# Patient Record
Sex: FEMALE | Race: WHITE | Employment: OTHER | URBAN - METROPOLITAN AREA
[De-identification: names, ages, dates, MRNs, and addresses within clinical notes are randomized per-mention and may not be internally consistent; named-entity substitution may affect disease eponyms.]

---

## 2019-04-08 RX ORDER — M-VIT,TX,IRON,MINS/CALC/FOLIC 27MG-0.4MG
1 TABLET ORAL DAILY
COMMUNITY

## 2019-04-08 RX ORDER — ASPIRIN 81 MG/1
81 TABLET ORAL DAILY
COMMUNITY

## 2019-04-08 RX ORDER — EZETIMIBE 10 MG/1
10 TABLET ORAL DAILY
COMMUNITY

## 2019-04-08 RX ORDER — ROSUVASTATIN CALCIUM 40 MG/1
40 TABLET, COATED ORAL DAILY
COMMUNITY

## 2019-04-08 RX ORDER — LISINOPRIL 10 MG/1
10 TABLET ORAL DAILY
COMMUNITY

## 2019-04-08 RX ORDER — METFORMIN HYDROCHLORIDE 500 MG/1
500 TABLET, EXTENDED RELEASE ORAL 2 TIMES DAILY
COMMUNITY

## 2019-04-08 RX ORDER — OMEGA-3S/DHA/EPA/FISH OIL/D3 300MG-1000
400 CAPSULE ORAL DAILY
COMMUNITY

## 2019-04-10 ENCOUNTER — ANESTHESIA EVENT (OUTPATIENT)
Dept: OPERATING ROOM | Age: 74
End: 2019-04-10
Payer: MEDICARE

## 2019-04-10 ENCOUNTER — ANESTHESIA (OUTPATIENT)
Dept: OPERATING ROOM | Age: 74
End: 2019-04-10
Payer: MEDICARE

## 2019-04-10 ENCOUNTER — HOSPITAL ENCOUNTER (OUTPATIENT)
Age: 74
Setting detail: OUTPATIENT SURGERY
Discharge: HOME | End: 2019-04-10
Attending: OPHTHALMOLOGY | Admitting: OPHTHALMOLOGY
Payer: MEDICARE

## 2019-04-10 VITALS — DIASTOLIC BLOOD PRESSURE: 61 MMHG | SYSTOLIC BLOOD PRESSURE: 125 MMHG | OXYGEN SATURATION: 100 %

## 2019-04-10 VITALS
RESPIRATION RATE: 16 BRPM | OXYGEN SATURATION: 100 % | TEMPERATURE: 97.7 F | DIASTOLIC BLOOD PRESSURE: 67 MMHG | HEART RATE: 79 BPM | SYSTOLIC BLOOD PRESSURE: 139 MMHG | HEIGHT: 66 IN | WEIGHT: 144 LBS | BODY MASS INDEX: 23.14 KG/M2

## 2019-04-10 LAB
GFR NON-AFRICAN AMERICAN: >60 ML/MIN
GFR SERPL CREATININE-BSD FRML MDRD: >60 ML/MIN
GFR SERPL CREATININE-BSD FRML MDRD: ABNORMAL ML/MIN/{1.73_M2}
GLUCOSE BLD-MCNC: 114 MG/DL (ref 65–105)
GLUCOSE BLD-MCNC: 141 MG/DL (ref 74–100)
POC CREATININE: 0.46 MG/DL (ref 0.51–1.19)

## 2019-04-10 PROCEDURE — 6360000002 HC RX W HCPCS: Performed by: OPHTHALMOLOGY

## 2019-04-10 PROCEDURE — 3700000000 HC ANESTHESIA ATTENDED CARE: Performed by: OPHTHALMOLOGY

## 2019-04-10 PROCEDURE — 6370000000 HC RX 637 (ALT 250 FOR IP)

## 2019-04-10 PROCEDURE — 2500000003 HC RX 250 WO HCPCS: Performed by: NURSE ANESTHETIST, CERTIFIED REGISTERED

## 2019-04-10 PROCEDURE — 6370000000 HC RX 637 (ALT 250 FOR IP): Performed by: OPHTHALMOLOGY

## 2019-04-10 PROCEDURE — 93005 ELECTROCARDIOGRAM TRACING: CPT

## 2019-04-10 PROCEDURE — 82565 ASSAY OF CREATININE: CPT

## 2019-04-10 PROCEDURE — 82947 ASSAY GLUCOSE BLOOD QUANT: CPT

## 2019-04-10 PROCEDURE — 2709999900 HC NON-CHARGEABLE SUPPLY: Performed by: OPHTHALMOLOGY

## 2019-04-10 PROCEDURE — 2580000003 HC RX 258: Performed by: OPHTHALMOLOGY

## 2019-04-10 PROCEDURE — 3700000001 HC ADD 15 MINUTES (ANESTHESIA): Performed by: OPHTHALMOLOGY

## 2019-04-10 PROCEDURE — 6360000002 HC RX W HCPCS: Performed by: NURSE ANESTHETIST, CERTIFIED REGISTERED

## 2019-04-10 PROCEDURE — 3600000004 HC SURGERY LEVEL 4 BASE: Performed by: OPHTHALMOLOGY

## 2019-04-10 PROCEDURE — 2580000003 HC RX 258: Performed by: ANESTHESIOLOGY

## 2019-04-10 PROCEDURE — 7100000041 HC SPAR PHASE II RECOVERY - ADDTL 15 MIN: Performed by: OPHTHALMOLOGY

## 2019-04-10 PROCEDURE — 2500000003 HC RX 250 WO HCPCS: Performed by: OPHTHALMOLOGY

## 2019-04-10 PROCEDURE — 3600000014 HC SURGERY LEVEL 4 ADDTL 15MIN: Performed by: OPHTHALMOLOGY

## 2019-04-10 PROCEDURE — 6370000000 HC RX 637 (ALT 250 FOR IP): Performed by: ANESTHESIOLOGY

## 2019-04-10 PROCEDURE — 7100000040 HC SPAR PHASE II RECOVERY - FIRST 15 MIN: Performed by: OPHTHALMOLOGY

## 2019-04-10 RX ORDER — PHENYLEPHRINE HYDROCHLORIDE 100 MG/ML
1 SOLUTION/ DROPS OPHTHALMIC EVERY 5 MIN PRN
Status: COMPLETED | OUTPATIENT
Start: 2019-04-10 | End: 2019-04-10

## 2019-04-10 RX ORDER — HYDRALAZINE HYDROCHLORIDE 20 MG/ML
5 INJECTION INTRAMUSCULAR; INTRAVENOUS EVERY 10 MIN PRN
Status: CANCELLED | OUTPATIENT
Start: 2019-04-10

## 2019-04-10 RX ORDER — CYCLOPENTOLATE HYDROCHLORIDE 10 MG/ML
1 SOLUTION/ DROPS OPHTHALMIC EVERY 5 MIN PRN
Status: COMPLETED | OUTPATIENT
Start: 2019-04-10 | End: 2019-04-10

## 2019-04-10 RX ORDER — LABETALOL HYDROCHLORIDE 5 MG/ML
5 INJECTION, SOLUTION INTRAVENOUS EVERY 10 MIN PRN
Status: CANCELLED | OUTPATIENT
Start: 2019-04-10

## 2019-04-10 RX ORDER — DEXAMETHASONE SODIUM PHOSPHATE 4 MG/ML
INJECTION, SOLUTION INTRA-ARTICULAR; INTRALESIONAL; INTRAMUSCULAR; INTRAVENOUS; SOFT TISSUE PRN
Status: DISCONTINUED | OUTPATIENT
Start: 2019-04-10 | End: 2019-04-10 | Stop reason: ALTCHOICE

## 2019-04-10 RX ORDER — PROMETHAZINE HYDROCHLORIDE 25 MG/ML
6.25 INJECTION, SOLUTION INTRAMUSCULAR; INTRAVENOUS
Status: CANCELLED | OUTPATIENT
Start: 2019-04-10 | End: 2019-04-10

## 2019-04-10 RX ORDER — FENTANYL CITRATE 50 UG/ML
25 INJECTION, SOLUTION INTRAMUSCULAR; INTRAVENOUS EVERY 5 MIN PRN
Status: CANCELLED | OUTPATIENT
Start: 2019-04-10

## 2019-04-10 RX ORDER — FENTANYL CITRATE 50 UG/ML
25 INJECTION, SOLUTION INTRAMUSCULAR; INTRAVENOUS EVERY 5 MIN PRN
Status: DISCONTINUED | OUTPATIENT
Start: 2019-04-10 | End: 2019-04-10 | Stop reason: HOSPADM

## 2019-04-10 RX ORDER — CEFAZOLIN SODIUM 500 MG/2.2ML
INJECTION, POWDER, FOR SOLUTION INTRAMUSCULAR; INTRAVENOUS PRN
Status: DISCONTINUED | OUTPATIENT
Start: 2019-04-10 | End: 2019-04-10 | Stop reason: ALTCHOICE

## 2019-04-10 RX ORDER — MIDAZOLAM HYDROCHLORIDE 1 MG/ML
1 INJECTION INTRAMUSCULAR; INTRAVENOUS EVERY 10 MIN PRN
Status: DISCONTINUED | OUTPATIENT
Start: 2019-04-10 | End: 2019-04-10 | Stop reason: HOSPADM

## 2019-04-10 RX ORDER — TOBRAMYCIN AND DEXAMETHASONE 3; 1 MG/ML; MG/ML
SUSPENSION/ DROPS OPHTHALMIC
Status: COMPLETED
Start: 2019-04-10 | End: 2019-04-10

## 2019-04-10 RX ORDER — GENTAMICIN SULFATE 3 MG/ML
1 SOLUTION/ DROPS OPHTHALMIC
Status: DISCONTINUED | OUTPATIENT
Start: 2019-04-10 | End: 2019-04-10 | Stop reason: DRUGHIGH

## 2019-04-10 RX ORDER — SODIUM CHLORIDE, SODIUM LACTATE, POTASSIUM CHLORIDE, CALCIUM CHLORIDE 600; 310; 30; 20 MG/100ML; MG/100ML; MG/100ML; MG/100ML
INJECTION, SOLUTION INTRAVENOUS CONTINUOUS
Status: DISCONTINUED | OUTPATIENT
Start: 2019-04-10 | End: 2019-04-10 | Stop reason: HOSPADM

## 2019-04-10 RX ORDER — DIPHENHYDRAMINE HYDROCHLORIDE 50 MG/ML
12.5 INJECTION INTRAMUSCULAR; INTRAVENOUS
Status: CANCELLED | OUTPATIENT
Start: 2019-04-10 | End: 2019-04-10

## 2019-04-10 RX ORDER — 0.9 % SODIUM CHLORIDE 0.9 %
500 INTRAVENOUS SOLUTION INTRAVENOUS
Status: CANCELLED | OUTPATIENT
Start: 2019-04-10 | End: 2019-04-10

## 2019-04-10 RX ORDER — FENTANYL CITRATE 50 UG/ML
50 INJECTION, SOLUTION INTRAMUSCULAR; INTRAVENOUS EVERY 5 MIN PRN
Status: CANCELLED | OUTPATIENT
Start: 2019-04-10

## 2019-04-10 RX ORDER — BALANCED SALT SOLUTION ENRICHED WITH BICARBONATE, DEXTROSE, AND GLUTATHIONE
KIT INTRAOCULAR PRN
Status: DISCONTINUED | OUTPATIENT
Start: 2019-04-10 | End: 2019-04-10 | Stop reason: ALTCHOICE

## 2019-04-10 RX ORDER — PREDNISOLONE ACETATE 10 MG/ML
1 SUSPENSION/ DROPS OPHTHALMIC
Status: DISCONTINUED | OUTPATIENT
Start: 2019-04-10 | End: 2019-04-10 | Stop reason: HOSPADM

## 2019-04-10 RX ORDER — 0.9 % SODIUM CHLORIDE 0.9 %
VIAL (ML) INJECTION PRN
Status: DISCONTINUED | OUTPATIENT
Start: 2019-04-10 | End: 2019-04-10 | Stop reason: ALTCHOICE

## 2019-04-10 RX ORDER — PROPOFOL 10 MG/ML
INJECTION, EMULSION INTRAVENOUS PRN
Status: DISCONTINUED | OUTPATIENT
Start: 2019-04-10 | End: 2019-04-10 | Stop reason: SDUPTHER

## 2019-04-10 RX ORDER — LIDOCAINE HYDROCHLORIDE 10 MG/ML
1 INJECTION, SOLUTION EPIDURAL; INFILTRATION; INTRACAUDAL; PERINEURAL
Status: DISCONTINUED | OUTPATIENT
Start: 2019-04-10 | End: 2019-04-10 | Stop reason: HOSPADM

## 2019-04-10 RX ORDER — OXYCODONE HYDROCHLORIDE AND ACETAMINOPHEN 5; 325 MG/1; MG/1
1 TABLET ORAL EVERY 4 HOURS PRN
Status: CANCELLED | OUTPATIENT
Start: 2019-04-10

## 2019-04-10 RX ORDER — DEXTROSE MONOHYDRATE 25 G/50ML
INJECTION, SOLUTION INTRAVENOUS PRN
Status: DISCONTINUED | OUTPATIENT
Start: 2019-04-10 | End: 2019-04-10 | Stop reason: ALTCHOICE

## 2019-04-10 RX ORDER — ATROPINE SULFATE 10 MG/ML
1 SOLUTION/ DROPS OPHTHALMIC
Status: COMPLETED | OUTPATIENT
Start: 2019-04-10 | End: 2019-04-10

## 2019-04-10 RX ORDER — ERYTHROMYCIN 5 MG/G
OINTMENT OPHTHALMIC PRN
Status: DISCONTINUED | OUTPATIENT
Start: 2019-04-10 | End: 2019-04-10 | Stop reason: ALTCHOICE

## 2019-04-10 RX ORDER — ONDANSETRON 2 MG/ML
4 INJECTION INTRAMUSCULAR; INTRAVENOUS DAILY PRN
Status: DISCONTINUED | OUTPATIENT
Start: 2019-04-10 | End: 2019-04-10 | Stop reason: HOSPADM

## 2019-04-10 RX ORDER — ONDANSETRON 2 MG/ML
4 INJECTION INTRAMUSCULAR; INTRAVENOUS
Status: CANCELLED | OUTPATIENT
Start: 2019-04-10 | End: 2019-04-10

## 2019-04-10 RX ORDER — TOBRAMYCIN AND DEXAMETHASONE 3; 1 MG/ML; MG/ML
1 SUSPENSION/ DROPS OPHTHALMIC
Status: COMPLETED | OUTPATIENT
Start: 2019-04-10 | End: 2019-04-10

## 2019-04-10 RX ORDER — SCOLOPAMINE TRANSDERMAL SYSTEM 1 MG/1
1 PATCH, EXTENDED RELEASE TRANSDERMAL ONCE
Status: DISCONTINUED | OUTPATIENT
Start: 2019-04-10 | End: 2019-04-10 | Stop reason: HOSPADM

## 2019-04-10 RX ORDER — BRIMONIDINE TARTRATE 2 MG/ML
SOLUTION/ DROPS OPHTHALMIC PRN
Status: DISCONTINUED | OUTPATIENT
Start: 2019-04-10 | End: 2019-04-10 | Stop reason: ALTCHOICE

## 2019-04-10 RX ORDER — LIDOCAINE HYDROCHLORIDE 10 MG/ML
INJECTION, SOLUTION EPIDURAL; INFILTRATION; INTRACAUDAL; PERINEURAL PRN
Status: DISCONTINUED | OUTPATIENT
Start: 2019-04-10 | End: 2019-04-10 | Stop reason: SDUPTHER

## 2019-04-10 RX ADMIN — LIDOCAINE HYDROCHLORIDE 50 MG: 10 INJECTION, SOLUTION EPIDURAL; INFILTRATION; INTRACAUDAL; PERINEURAL at 11:24

## 2019-04-10 RX ADMIN — CYCLOPENTOLATE HYDROCHLORIDE 1 DROP: 10 SOLUTION/ DROPS OPHTHALMIC at 10:35

## 2019-04-10 RX ADMIN — PROPOFOL 60 MG: 10 INJECTION, EMULSION INTRAVENOUS at 11:24

## 2019-04-10 RX ADMIN — SODIUM CHLORIDE, POTASSIUM CHLORIDE, SODIUM LACTATE AND CALCIUM CHLORIDE: 600; 310; 30; 20 INJECTION, SOLUTION INTRAVENOUS at 10:39

## 2019-04-10 RX ADMIN — PHENYLEPHRINE HYDROCHLORIDE 1 DROP: 100 SOLUTION/ DROPS OPHTHALMIC at 10:35

## 2019-04-10 RX ADMIN — TOBRAMYCIN AND DEXAMETHASONE 1 DROP: 3; 1 SUSPENSION/ DROPS OPHTHALMIC at 10:53

## 2019-04-10 RX ADMIN — PHENYLEPHRINE HYDROCHLORIDE 1 DROP: 100 SOLUTION/ DROPS OPHTHALMIC at 10:45

## 2019-04-10 RX ADMIN — CYCLOPENTOLATE HYDROCHLORIDE 1 DROP: 10 SOLUTION/ DROPS OPHTHALMIC at 10:40

## 2019-04-10 RX ADMIN — ATROPINE SULFATE 1 DROP: 10 SOLUTION/ DROPS OPHTHALMIC at 10:53

## 2019-04-10 RX ADMIN — PHENYLEPHRINE HYDROCHLORIDE 1 DROP: 100 SOLUTION/ DROPS OPHTHALMIC at 10:40

## 2019-04-10 RX ADMIN — CYCLOPENTOLATE HYDROCHLORIDE 1 DROP: 10 SOLUTION/ DROPS OPHTHALMIC at 10:45

## 2019-04-10 ASSESSMENT — PULMONARY FUNCTION TESTS
PIF_VALUE: 0
PIF_VALUE: 1
PIF_VALUE: 0
PIF_VALUE: 1
PIF_VALUE: 0
PIF_VALUE: 1
PIF_VALUE: 0
PIF_VALUE: 1
PIF_VALUE: 0
PIF_VALUE: 1
PIF_VALUE: 0
PIF_VALUE: 1
PIF_VALUE: 0

## 2019-04-10 ASSESSMENT — PAIN SCALES - GENERAL
PAINLEVEL_OUTOF10: 0

## 2019-04-10 ASSESSMENT — PAIN - FUNCTIONAL ASSESSMENT: PAIN_FUNCTIONAL_ASSESSMENT: 0-10

## 2019-04-10 NOTE — ANESTHESIA PRE PROCEDURE
Department of Anesthesiology  Preprocedure Note       Name:  Danielle Matta   Age:  69 y.o.  :  1945                                          MRN:  [id number]         Date:  4/10/2019      Surgeon: Josue Fritz):  Oriana Saba MD    Procedure: VITRECTOMY 25 GAUGE, POSSIBLE LASER, POSSIBLE GAS FLUID EXCHANGE (Right )    Medications prior to admission:   Prior to Admission medications    Medication Sig Start Date End Date Taking?  Authorizing Provider   ezetimibe (ZETIA) 10 MG tablet Take 10 mg by mouth daily   Yes Historical Provider, MD   rosuvastatin (CRESTOR) 40 MG tablet Take 40 mg by mouth daily   Yes Historical Provider, MD   lisinopril (PRINIVIL;ZESTRIL) 10 MG tablet Take 10 mg by mouth daily   Yes Historical Provider, MD   metFORMIN (GLUCOPHAGE-XR) 500 MG extended release tablet Take 500 mg by mouth 2 times daily   Yes Historical Provider, MD   Multiple Vitamins-Minerals (PRESERVISION AREDS 2 PO) Take 1 tablet by mouth 2 times daily   Yes Historical Provider, MD   Multiple Vitamins-Minerals (THERAPEUTIC MULTIVITAMIN-MINERALS) tablet Take 1 tablet by mouth daily   Yes Historical Provider, MD   SUPER B COMPLEX/C PO Take 1 tablet by mouth daily   Yes Historical Provider, MD   vitamin D3 (CHOLECALCIFEROL) 400 units TABS tablet Take 400 Units by mouth daily   Yes Historical Provider, MD   Calcium-Magnesium-Vitamin D (CITRACAL CALCIUM+D PO) Take 1 tablet by mouth nightly Indications: 400MG/500IU   Yes Historical Provider, MD   aspirin 81 MG EC tablet Take 81 mg by mouth daily Indications: PT HAS STOPPED-3/31/19    Yes Historical Provider, MD       Current medications:    Current Facility-Administered Medications   Medication Dose Route Frequency Provider Last Rate Last Dose    atropine 1 % ophthalmic solution 1 drop  1 drop Right Eye Once PRN Oriana Saba MD        cyclopentolate (CYCLOGYL) 1 % ophthalmic solution 1 drop  1 drop Right Eye Q5 Min PRN Oriana Sbaa MD        phenylephrine (ASHLEY-SYNEPHRINE) 10 % POCK, POCCL, POCBUN, POCHEMO, POCHCT in the last 72 hours. Coags: No results found for: PROTIME, INR, APTT    HCG (If Applicable): No results found for: PREGTESTUR, PREGSERUM, HCG, HCGQUANT     ABGs: No results found for: PHART, PO2ART, QQJ8FZY, PHZ3WKW, BEART, P5HTCEWK     Type & Screen (If Applicable):  No results found for: LABABO, [address]    Anesthesia Evaluation  Patient summary reviewed no history of anesthetic complications:   Airway: Mallampati: I       Mouth opening: > = 3 FB Dental:    (+) partials and lower dentures      Pulmonary:Negative Pulmonary ROS and normal exam  breath sounds clear to auscultation      (-) COPD, recent URI and sleep apnea                           Cardiovascular:  Exercise tolerance: good (>4 METS),   (+) hypertension:,     (-) past MI, CAD,  angina and  CHF      Rhythm: regular  Rate: normal                    Neuro/Psych:   Negative Neuro/Psych ROS     (-) seizures, TIA and CVA           GI/Hepatic/Renal: Neg GI/Hepatic/Renal ROS       (-) hepatitis, liver disease and no renal disease       Endo/Other:    (+) DiabetesType II DM, , .                 Abdominal:           Vascular: negative vascular ROS. - PVD and DVT. Anesthesia Plan      MAC     ASA 3       Induction: intravenous. Anesthetic plan and risks discussed with patient. Plan discussed with CRNA.                   Daren Stack MD   4/10/2019

## 2019-04-10 NOTE — H&P
History and Physical    Pt Name: Rosaline Wilson  MRN: [id number]  YOB: 1945  Date of evaluation: 4/10/2019  Primary Care Physician: Yesenia Valencia  Patient evaluated at the request of  Dr. Gareld Severin    Reason for evaluation:   Right eye PDR with vitreous hemorrhage  SUBJECTIVE:   History of Chief Complaint:      Pritesh Mason is a 69 y.o. female          who presents with a hx of visual  changes that started approx  2 weeks ago  ago in   Her RIGHT   eye , blurry , floaters and very cloudy or foggy, also black gray dots ,  DM x 8 yrs approx , denies any blood in her right eye . Past Medical History      has a past medical history of Diabetes mellitus ([address] Diabetic macular edema ([address] Diabetic retinopathy ([address] [place] (hard of hearing), Hyperlipidemia, Hypertension, and Wears partial dentures. Past Surgical History   has a past surgical history that includes Cholecystectomy (1997); Colonoscopy; Tubal ligation (1982); and lipoma resection (Right, 1981). Medications   Scheduled Meds:   scopolamine  1 patch Transdermal Once     Continuous Infusions:   lactated ringers       PRN Meds:.atropine, cyclopentolate, phenylephrine, gentamicin, prednisoLONE acetate, lidocaine PF, fentanNYL, midazolam, ondansetron  Allergies  has No Known Allergies. Family History    family history includes Cancer in her sister.     Family Status   Relation Name Status    Sister  (Not Specified)         Social History  Social History     Socioeconomic History    Marital status:      Spouse name: Not on file    Number of children: Not on file    Years of education: Not on file    Highest education level: Not on file   Occupational History    Not on file   Social Needs    Financial resource strain: Not on file    Food insecurity:     Worry: Not on file     Inability: Not on file    Transportation needs:     Medical: Not on file     Non-medical: Not on file   Tobacco Use    Smoking status: Never Smoker    Smokeless tobacco: Never Used   Substance and Sexual Activity    Alcohol use: Not Currently    Drug use: Never    Sexual activity: Not on file   Lifestyle    Physical activity:     Days per week: Not on file     Minutes per session: Not on file    Stress: Not on file   Relationships    Social connections:     Talks on phone: Not on file     Gets together: Not on file     Attends [employer] service: Not on file     Active member of club or organization: Not on file     Attends meetings of clubs or organizations: Not on file     Relationship status: Not on file    Intimate partner violence:     Fear of current or ex partner: Not on file     Emotionally abused: Not on file     Physically abused: Not on file     Forced sexual activity: Not on file   Other Topics Concern    Not on file   Social History Narrative    Not on file                 Hobbies:   Watches TV     OBJECTIVE:   VITALS:  height is 5' 6\" (1.676 m) and weight is 144 lb (65.3 kg). Her temporal temperature is 97.9 °F (36.6 °C). Her blood pressure is 124/59 (abnormal) and her pulse is 88. Her respiration is 16 and oxygen saturation is 100%. CONSTITUTIONAL: Alert and oriented times 3, no acute distress and cooperative to examination. friendly and pleasant     SKIN: rash No    HEENT: Head is normocephalic, atraumatic. EOMI,     Oral air way :slightly narrow Yes    NECK: neck supple, no lymphadenopathy noted, trachea midline and straight       2+ carotid, no bruit    LUNGS: Chest expands equally bilaterally upon respiration, no accessory muscle used. Ausculation reveals no adventitious breath sounds. CARDIOVASCULAR: \"Heart sounds are normal.  Regular rate and rhythm without murmur,    ABDOMEN: Bowel sounds are present in all four quadrants      GENATALIA:Deferred. NEUROLOGIC: \"CN II-XII are grossly intact.        EXTREMITIES: Pitting edema:  No,  Varicose veins: No     Dorsal pedal/posterior tibial pulses palpable: Yes         Strength:  Normal       There is no problem list on file for this patient. IMPRESSIONS:   1.  right eye vitreous hemorrhage   2. does not have a problem list on file.     Zaynab Valenzuela [id number]  Electronically signed 4/10/2019 at 10:13 AM       Scheduled for:   right eye  Surgery

## 2019-04-10 NOTE — PROGRESS NOTES
1245 pt tolerating poi. Daughters remain at bedside. Discharge instructions reviewed. All questions answered. Daughters verbalize understanding. 1308 pt discharged in w/c after voiding. Pt has all personal items. Daughter has instructions and eye kit.

## 2019-04-10 NOTE — ANESTHESIA POSTPROCEDURE EVALUATION
Department of Anesthesiology  Postprocedure Note    Patient: Low De León  MRN: [id number]  YOB: 1945  Date of evaluation: 4/10/2019  Time:  12:20 PM     Procedure Summary     Date:  04/10/19 Room / Location:  [facility] OR [address] [place] [address] OR    Anesthesia Start:  1119 Anesthesia Stop:  1209    Procedure:  VITRECTOMY 25 GAUGE, ENDOLASER, 200 MWATTS, 200 MSECONDS, 869 SPOTS, PRP FOR PDR (Right Eye) Diagnosis:  (PROLIFERATIVE DIABETIC RETINOPATHY  WITH VITREOUS HEMORRHAGE RIGHT EYE)    Surgeon:  Kianna Velazco MD Responsible Provider:  Stephie Saravia MD    Anesthesia Type:  MAC ASA Status:  3          Anesthesia Type: MAC    José Miguel Phase I:      José Miguel Phase II: José Miguel Score: 10    Last vitals: Reviewed and per EMR flowsheets.        Anesthesia Post Evaluation    Patient location during evaluation: PACU  Patient participation: complete - patient participated  Level of consciousness: awake  Pain score: 1  Airway patency: patent  Nausea & Vomiting: no nausea and no vomiting  Complications: no  Cardiovascular status: blood pressure returned to baseline and hemodynamically stable  Respiratory status: acceptable  Hydration status: euvolemic

## 2019-04-10 NOTE — BRIEF OP NOTE
Brief Postoperative Note  ______________________________________________________________    Patient: Mary Ellen Dudley  YOB: 1945  MRN: [id number]  Date of Procedure: 4/10/2019    Pre-Op Diagnosis: PROLIFERATIVE DIABETIC RETINOPATHY  WITH VITREOUS HEMORRHAGE RIGHT EYE    Post-Op Diagnosis: Same; ?  CRVO       Procedure(s):  VITRECTOMY 25 GAUGE, ENDOLASER, 200 MWATTS, 200 MSECONDS, 869 SPOTS, PRP FOR PDR    Anesthesia: Monitor Anesthesia Care    Surgeon(s):  Carla Lewis MD      Estimated Blood Loss (mL): <1 cc    Complications: none      Carla Lewis MD  Date: 4/10/2019  Time: 12:03 PM

## 2019-04-11 LAB
EKG ATRIAL RATE: 78 BPM
EKG P AXIS: 49 DEGREES
EKG P-R INTERVAL: 150 MS
EKG Q-T INTERVAL: 386 MS
EKG QRS DURATION: 82 MS
EKG QTC CALCULATION (BAZETT): 440 MS
EKG R AXIS: -5 DEGREES
EKG T AXIS: 31 DEGREES
EKG VENTRICULAR RATE: 78 BPM

## 2019-04-15 NOTE — OP NOTE
Surgeon(s)/Proceduralist(s) and Assistant(s):  Surgeon(s) and Role:     August Pinto MD - Primary    OPERATION:   pars plana vitrectomy, endolaser panretinal photocoagulation and gas air fluid exchange right eye     Anesthesia: see anesthesia records    PREOPERATIVE DIAGNOSIS: 1. PROLIFERATIVE DIABETIC RETINOPATHY  WITH VITREOUS HEMORRHAGE RIGHT EYE      POSTOPERATIVE DIAGNOSIS: 1. PROLIFERATIVE DIABETIC RETINOPATHY  WITH VITREOUS HEMORRHAGE RIGHT EYE  2. ? UNDERLYING CRVO (VESSEL TOTUOSITY, AV NICKING, RETINAL HEMORRHAGES)    OPERATIVE PROCEDURE: After the risks, benefits, and alternatives of the procedure were explained to the patient, informed consent was obtained. The attending surgeon, Dr. Jorge Miranda, identified the patient in the preoperative holding area, and the appropriate operative site was marked. Patient was then brought back to the operating room, sedated and a retrobulbar block consisting of 50:50 mixture of 2% lidocaine and 0.75% Marcaine was placed into the right retrobulbar space without complication. Verbal time-out was called prior to completing the retrobulbar block to confirm the surgical location. The patient was then prepped and draped in the usual ophthalmic fashion. At this time, attention was turned to the vitrectomy portion of the procedure using a 25-gauge trocar cannula system. The 25-gauge trocar cannula system was used to place a cannula in the typical beveled entry in the inferotemporal quadrant. An infusion line was assembled and flushed free of all in-line air, and the infusion line was then clamped and then placed into the cannula and was directly visualized in the vitreous cavity, and then it was unclamped. After unclamping the infusion line, two additional cannulas were placed in the superotemporal and superonasal quadrants in the similar fashion.  At this time, using the [facility] wide-angle viewing system, the vitreous cutter and the light pipe were introduced into the eye and a core vitrectomy was performed. At all times throughout the case light exposure to the macula and fovea were minimized to reduce the risk of phototoxicity. A posterior vitreous separation was induced at the time of surgery. Next endolaser pan retinal  photocoagulation was performed. After performing the endolaser, an air-fluid exchange was performed. Additional endolaser was performed anterior to those areas previously treated. After completing the endolaser, additional aspiration was performed with the soft tip cannula to confirm that all the fluid had been aspirated from the posterior pole. The cannulas were removed and the sclerotomies were watertight. Subconjunctival injections of ancef and dexamethasone were given. Sterile ophthalmic dressing consisting of cyclogyl, tobradex, and erythromycin ointment were placed in the eye. A patch and shield were placed in the eye. Patient tolerated the procedure well without any complications, was transferred to postoperative recovery area in good condition.      Specimens: * No specimens in log *    Implantable Devices: * No implants in log *'    Drains: none    Complications: none    Liat Yang MD

## (undated) DEVICE — RETINA PK

## (undated) DEVICE — SURGICAL PROCEDURE PACK 25 GA VITRECTOMY

## (undated) DEVICE — 23GA EZPASS SOFT TIP CANNULA BOX OF 5: Brand: VORTEX SURGICAL INC

## (undated) DEVICE — SOLUTION IRRIG 1000ML PENTALYTE PLAS POUR BTL TIS U SOL

## (undated) DEVICE — 3 ML SYRINGE LUER-LOCK TIP: Brand: MONOJECT

## (undated) DEVICE — 23 GA FLEXIBLE TIP LASER PROBE: Brand: ALCON, ENGAUGE

## (undated) DEVICE — 25GA EZPASS SOFT TIP CANNULA BOX OF 5: Brand: VORTEX SURGICAL INC

## (undated) DEVICE — PROBE LASER TAPR 25 GA FLX ILLUMINATED

## (undated) DEVICE — DISCONTINUED USE 435154 INVENTORY EXISTS CSFILTER SYRINGE BL ST SLGS033SS (50/BX)

## (undated) DEVICE — NEEDLE HYPO 30GA L0.5IN BGE POLYPR HUB S STL REG BVL STR

## (undated) DEVICE — 6 ML SYRINGE LUER-LOCK TIP: Brand: MONOJECT

## (undated) DEVICE — CAUTERY SURG 23GA BPLR 6 PER BX

## (undated) DEVICE — AGENT VISCOELASTIC 1ML 2% HYDROXYPROPYL METHCELL PRELD GLS

## (undated) DEVICE — 1 EACH 40411 STERILE DISPOSABLE SUPER VIEW® LENS SET & 1 EACH 40100 STERILE MICROSCOPE DRAPE: Brand: SUPER VIEW® PACK